# Patient Record
Sex: MALE | Race: WHITE | Employment: FULL TIME | URBAN - METROPOLITAN AREA
[De-identification: names, ages, dates, MRNs, and addresses within clinical notes are randomized per-mention and may not be internally consistent; named-entity substitution may affect disease eponyms.]

---

## 2017-02-20 RX ORDER — LOSARTAN POTASSIUM 100 MG/1
100 TABLET ORAL DAILY
Qty: 30 TAB | Refills: 3 | Status: SHIPPED | OUTPATIENT
Start: 2017-02-20 | End: 2017-04-26 | Stop reason: SDUPTHER

## 2017-04-26 ENCOUNTER — OFFICE VISIT (OUTPATIENT)
Dept: INTERNAL MEDICINE CLINIC | Age: 62
End: 2017-04-26

## 2017-04-26 ENCOUNTER — HOSPITAL ENCOUNTER (OUTPATIENT)
Dept: LAB | Age: 62
Discharge: HOME | End: 2017-04-26

## 2017-04-26 VITALS
BODY MASS INDEX: 26.21 KG/M2 | OXYGEN SATURATION: 97 % | WEIGHT: 204.2 LBS | HEART RATE: 56 BPM | HEIGHT: 74 IN | TEMPERATURE: 97.4 F | RESPIRATION RATE: 16 BRPM | DIASTOLIC BLOOD PRESSURE: 82 MMHG | SYSTOLIC BLOOD PRESSURE: 136 MMHG

## 2017-04-26 DIAGNOSIS — Z00.00 PHYSICAL EXAM: Primary | ICD-10-CM

## 2017-04-26 DIAGNOSIS — I10 ESSENTIAL HYPERTENSION: ICD-10-CM

## 2017-04-26 PROCEDURE — 99001 SPECIMEN HANDLING PT-LAB: CPT | Performed by: INTERNAL MEDICINE

## 2017-04-26 RX ORDER — HYDROCHLOROTHIAZIDE 25 MG/1
25 TABLET ORAL DAILY
Qty: 90 TAB | Refills: 3 | Status: SHIPPED | OUTPATIENT
Start: 2017-04-26 | End: 2018-05-23 | Stop reason: SDUPTHER

## 2017-04-26 RX ORDER — LOSARTAN POTASSIUM 100 MG/1
100 TABLET ORAL DAILY
Qty: 90 TAB | Refills: 3 | Status: SHIPPED | OUTPATIENT
Start: 2017-04-26 | End: 2018-03-29 | Stop reason: SDUPTHER

## 2017-04-26 NOTE — MR AVS SNAPSHOT
Visit Information Date & Time Provider Department Dept. Phone Encounter #  
 4/26/2017  7:30 AM [address] [phone] [id number] Follow-up Instructions Return in about 1 year (around 4/26/2018), or if symptoms worsen or fail to improve, for hypertension. Upcoming Health Maintenance Date Due FOBT Q 1 YEAR AGE 50-75 2/12/2005 ZOSTER VACCINE AGE 60> 2/12/2015 INFLUENZA AGE 9 TO ADULT 8/1/2016 DTaP/Tdap/Td series (2 - Td) 4/26/2027 Allergies as of 4/26/2017  Review Complete On: 4/26/2017 By: Jessica Pat MD  
 No Known Allergies Current Immunizations  Reviewed on 3/8/2013 Name Date Hep A Vaccine (Adult) 2/5/2013 Hep B Vaccine (Adult) 3/8/2013, 2/5/2013 Influenza Vaccine Split 11/7/2012 Typhoid Vaccine, Parenteral, Other than Acetone-Killed, Dried 2/5/2013 Not reviewed this visit You Were Diagnosed With   
  
 Codes Comments Physical exam    -  Primary ICD-10-CM: Z00.00 ICD-9-CM: V70.9 Essential hypertension     ICD-10-CM: I10 
ICD-9-CM: 401.9 Vitals BP Pulse Temp Resp Height(growth percentile) Weight(growth percentile) 136/82 (BP 1 Location: Left arm, BP Patient Position: Sitting) (!) 56 97.4 °F (36.3 °C) (Oral) 16 6' 2\" (1.88 m) 204 lb 3.2 oz (92.6 kg) SpO2 BMI Smoking Status 97% 26.22 kg/m2 Never Smoker Vitals History BMI and BSA Data Body Mass Index Body Surface Area  
 26.22 kg/m 2 2.2 m 2 Preferred Pharmacy Pharmacy Name Phone [address] [facility] [phone] Your Updated Medication List  
  
   
This list is accurate as of: 4/26/17  8:05 AM.  Always use your most recent med list.  
  
  
  
  
 hydroCHLOROthiazide 25 mg tablet Commonly known as:  HYDRODIURIL Take 1 Tab by mouth daily. losartan 100 mg tablet Commonly known as:  COZAAR  
 Take 1 Tab by mouth daily. Follow-up Instructions Return in about 1 year (around 4/26/2018), or if symptoms worsen or fail to improve, for hypertension. To-Do List   
 04/26/2017 Lab:  LIPID PANEL   
  
 04/26/2017 Lab:  METABOLIC PANEL, COMPREHENSIVE   
  
 04/26/2017 Lab:  PSA SCREENING (SCREENING) Patient Instructions DASH Diet: Care Instructions Your Care Instructions The DASH diet is an eating plan that can help lower your blood pressure. DASH stands for Dietary Approaches to Stop Hypertension. Hypertension is high blood pressure. The DASH diet focuses on eating foods that are high in calcium, potassium, and magnesium. These nutrients can lower blood pressure. The foods that are highest in these nutrients are fruits, vegetables, low-fat dairy products, nuts, seeds, and legumes. But taking calcium, potassium, and magnesium supplements instead of eating foods that are high in those nutrients does not have the same effect. The DASH diet also includes whole grains, fish, and poultry. The DASH diet is one of several lifestyle changes your doctor may recommend to lower your high blood pressure. Your doctor may also want you to decrease the amount of sodium in your diet. Lowering sodium while following the DASH diet can lower blood pressure even further than just the DASH diet alone. Follow-up care is a key part of your treatment and safety. Be sure to make and go to all appointments, and call your doctor if you are having problems. It's also a good idea to know your test results and keep a list of the medicines you take. How can you care for yourself at home? Following the DASH diet · Eat 4 to 5 servings of fruit each day. A serving is 1 medium-sized piece of fruit, ½ cup chopped or canned fruit, 1/4 cup dried fruit, or 4 ounces (½ cup) of fruit juice. Choose fruit more often than fruit juice. · Eat 4 to 5 servings of vegetables each day. A serving is 1 cup of lettuce or raw leafy vegetables, ½ cup of chopped or cooked vegetables, or 4 ounces (½ cup) of vegetable juice. Choose vegetables more often than vegetable juice. · Get 2 to 3 servings of low-fat and fat-free dairy each day. A serving is 8 ounces of milk, 1 cup of yogurt, or 1 ½ ounces of cheese. · Eat 6 to 8 servings of grains each day. A serving is 1 slice of bread, 1 ounce of dry cereal, or ½ cup of cooked rice, pasta, or cooked cereal. Try to choose whole-grain products as much as possible. · Limit lean meat, poultry, and fish to 2 servings each day. A serving is 3 ounces, about the size of a deck of cards. · Eat 4 to 5 servings of nuts, seeds, and legumes (cooked dried beans, lentils, and split peas) each week. A serving is 1/3 cup of nuts, 2 tablespoons of seeds, or ½ cup of cooked beans or peas. · Limit fats and oils to 2 to 3 servings each day. A serving is 1 teaspoon of vegetable oil or 2 tablespoons of salad dressing. · Limit sweets and added sugars to 5 servings or less a week. A serving is 1 tablespoon jelly or jam, ½ cup sorbet, or 1 cup of lemonade. · Eat less than 2,300 milligrams (mg) of sodium a day. If you limit your sodium to 1,500 mg a day, you can lower your blood pressure even more. Tips for success · Start small. Do not try to make dramatic changes to your diet all at once. You might feel that you are missing out on your favorite foods and then be more likely to not follow the plan. Make small changes, and stick with them. Once those changes become habit, add a few more changes. · Try some of the following: ¨ Make it a goal to eat a fruit or vegetable at every meal and at snacks. This will make it easy to get the recommended amount of fruits and vegetables each day. ¨ Try yogurt topped with fruit and nuts for a snack or healthy dessert. ¨ Add lettuce, tomato, cucumber, and onion to sandwiches. ¨ Combine a ready-made pizza crust with low-fat mozzarella cheese and lots of vegetable toppings. Try using tomatoes, squash, spinach, broccoli, carrots, cauliflower, and onions. ¨ Have a variety of cut-up vegetables with a low-fat dip as an appetizer instead of chips and dip. ¨ Sprinkle sunflower seeds or chopped almonds over salads. Or try adding chopped walnuts or almonds to cooked vegetables. ¨ Try some vegetarian meals using beans and peas. Add garbanzo or kidney beans to salads. Make burritos and tacos with mashed meeks beans or black beans. Where can you learn more? Go to http://chayito-shimon.info/. Enter J314 in the search box to learn more about \"DASH Diet: Care Instructions. \" Current as of: March 23, 2016 Content Version: 11.2 © 1336-0218 [employer]uxe[employer]. Care instructions adapted under license by bitFlyer (which disclaims liability or warranty for this information). If you have questions about a medical condition or this instruction, always ask your healthcare professional. [place] [address] any warranty or liability for your use of this information. Introducing [facility] & HEALTH SERVICES! Dear Ronnell Cat: Thank you for requesting a Microarrays account. Our records indicate that you already have an active Microarrays account. You can access your account anytime at [email or web address] Did you know that you can access your hospital and ER discharge instructions at any time in Microarrays? You can also review all of your test results from your hospital stay or ER visit. Additional Information If you have questions, please visit the Frequently Asked Questions section of the Microarrays website at [email or web address] Remember, Microarrays is NOT to be used for urgent needs. For medical emergencies, dial 911. Now available from your iPhone and Android! Please provide this summary of care documentation to your next provider. Your primary care clinician is listed as Wilfred Sylvester. If you have any questions after today's visit, please call [phone].

## 2017-04-26 NOTE — PATIENT INSTRUCTIONS

## 2017-04-26 NOTE — PROGRESS NOTES
Pt presents today for a physical exam     Pt preferred language for health care discussion is english. Is someone accompanying this pt? no    Is the patient using any DME equipment during OV? no    Depression Screening completed. yes    Learning Assessment completed. yes    Abuse Screening completed. yes    Health Maintenance reviewed and discussed per provider. yes    Advance Directive:  1. Do you have an advance directive in place? Patient Reply: No    2. If not, would you like material regarding how to put one in place? Patient Reply:     Coordination of Care:  1. Have you been to the ER, urgent care clinic since your last visit? Hospitalized since your last visit? no    2.  Have you seen or consulted any other health care providers outside of the [address] [place] [address] since your last visit?  no
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
## 2017-04-27 LAB
ALBUMIN SERPL-MCNC: 4.4 G/DL (ref 3.6–4.8)
ALBUMIN/GLOB SERPL: 1.7 {RATIO} (ref 1.2–2.2)
ALP SERPL-CCNC: 45 IU/L (ref 39–117)
ALT SERPL-CCNC: 24 IU/L (ref 0–44)
AST SERPL-CCNC: 27 IU/L (ref 0–40)
BILIRUB SERPL-MCNC: 0.5 MG/DL (ref 0–1.2)
BUN SERPL-MCNC: 15 MG/DL (ref 8–27)
BUN/CREAT SERPL: 17 (ref 10–24)
CALCIUM SERPL-MCNC: 9.7 MG/DL (ref 8.6–10.2)
CHLORIDE SERPL-SCNC: 97 MMOL/L (ref 96–106)
CHOLEST SERPL-MCNC: 213 MG/DL (ref 100–199)
CO2 SERPL-SCNC: 20 MMOL/L (ref 18–29)
CREAT SERPL-MCNC: 0.88 MG/DL (ref 0.76–1.27)
GLOBULIN SER CALC-MCNC: 2.6 G/DL (ref 1.5–4.5)
GLUCOSE SERPL-MCNC: 95 MG/DL (ref 65–99)
HDLC SERPL-MCNC: 62 MG/DL
INTERPRETATION, 910389: NORMAL
LDLC SERPL CALC-MCNC: 133 MG/DL (ref 0–99)
POTASSIUM SERPL-SCNC: 4.4 MMOL/L (ref 3.5–5.2)
PROT SERPL-MCNC: 7 G/DL (ref 6–8.5)
PSA SERPL-MCNC: 1.7 NG/ML (ref 0–4)
SODIUM SERPL-SCNC: 138 MMOL/L (ref 134–144)
TRIGL SERPL-MCNC: 89 MG/DL (ref 0–149)
VLDLC SERPL CALC-MCNC: 18 MG/DL (ref 5–40)

## 2017-09-08 ENCOUNTER — OFFICE VISIT (OUTPATIENT)
Dept: INTERNAL MEDICINE CLINIC | Age: 62
End: 2017-09-08

## 2017-09-08 ENCOUNTER — HOSPITAL ENCOUNTER (OUTPATIENT)
Dept: LAB | Age: 62
Discharge: HOME | End: 2017-09-08

## 2017-09-08 VITALS
DIASTOLIC BLOOD PRESSURE: 78 MMHG | WEIGHT: 196 LBS | RESPIRATION RATE: 16 BRPM | SYSTOLIC BLOOD PRESSURE: 111 MMHG | HEART RATE: 68 BPM | BODY MASS INDEX: 25.15 KG/M2 | HEIGHT: 74 IN | TEMPERATURE: 98.3 F | OXYGEN SATURATION: 96 %

## 2017-09-08 DIAGNOSIS — I10 ESSENTIAL HYPERTENSION: ICD-10-CM

## 2017-09-08 DIAGNOSIS — S22.41XD CLOSED FRACTURE OF MULTIPLE RIBS OF RIGHT SIDE WITH ROUTINE HEALING, SUBSEQUENT ENCOUNTER: Primary | ICD-10-CM

## 2017-09-08 PROCEDURE — 99001 SPECIMEN HANDLING PT-LAB: CPT | Performed by: INTERNAL MEDICINE

## 2017-09-08 RX ORDER — OXYCODONE AND ACETAMINOPHEN 5; 325 MG/1; MG/1
TABLET ORAL
COMMUNITY
Start: 2017-09-06 | End: 2019-08-22

## 2017-09-08 NOTE — MR AVS SNAPSHOT
Visit Information Date & Time Provider Department Dept. Phone Encounter #  
 9/8/2017  2:45 PM Tim Sommers [address] [phone] Follow-up Instructions Return if symptoms worsen or fail to improve. Upcoming Health Maintenance Date Due FOBT Q 1 YEAR AGE 50-75 2/12/2005 ZOSTER VACCINE AGE 60> 12/12/2014 DTaP/Tdap/Td series (2 - Td) 4/26/2027 Allergies as of 9/8/2017  Review Complete On: 4/26/2017 By: Tim Sommers MD  
 No Known Allergies Current Immunizations  Reviewed on 3/8/2013 Name Date Hep A Vaccine (Adult) 2/5/2013 Hep B Vaccine (Adult) 3/8/2013, 2/5/2013 Influenza Vaccine Split 11/7/2012 Typhoid Vaccine, Parenteral, Other than Acetone-Killed, Dried 2/5/2013 Not reviewed this visit You Were Diagnosed With   
  
 Codes Comments Closed fracture of multiple ribs of right side with routine healing, subsequent encounter    -  Primary ICD-10-CM: S22.41XD ICD-9-CM: V54.19 Essential hypertension     ICD-10-CM: I10 
ICD-9-CM: 401.9 Vitals BP Pulse Temp Resp Height(growth percentile) Weight(growth percentile) 111/78 (BP 1 Location: Left arm, BP Patient Position: Sitting) 68 98.3 °F (36.8 °C) (Oral) 16 6' 2\" (1.88 m) 196 lb (88.9 kg) SpO2 BMI Smoking Status 96% 25.16 kg/m2 Never Smoker Vitals History BMI and BSA Data Body Mass Index Body Surface Area  
 25.16 kg/m 2 2.15 m 2 Preferred Pharmacy Pharmacy Name Phone [address] [facility] [phone] Your Updated Medication List  
  
   
This list is accurate as of: 9/8/17  3:00 PM.  Always use your most recent med list.  
  
  
  
  
 hydroCHLOROthiazide 25 mg tablet Commonly known as:  HYDRODIURIL Take 1 Tab by mouth daily. losartan 100 mg tablet Commonly known as:  COZAAR Take 1 Tab by mouth daily. oxyCODONE-acetaminophen 5-325 mg per tablet Commonly known as:  PERCOCET Take 1 Tab by Mouth Every 6 Hours As Needed. spironolactone 25 mg tab 50 mg, hydroCHLOROthiazide 25 mg tab 50 mg Take  by Mouth. We Performed the Following CBC WITH AUTOMATED DIFF [77167 CPT(R)] METABOLIC PANEL, COMPREHENSIVE [48361 CPT(R)] URINALYSIS W/ RFLX MICROSCOPIC [10804 CPT(R)] Follow-up Instructions Return if symptoms worsen or fail to improve. To-Do List   
 09/08/2017 Lab:  CBC WITH AUTOMATED DIFF   
  
 09/08/2017 Lab:  METABOLIC PANEL, COMPREHENSIVE   
  
 09/08/2017 Lab:  URINALYSIS W/ RFLX MICROSCOPIC   
  
 09/08/2017 Imaging:  XR CHEST PA LAT Patient Instructions Broken Rib: Care Instructions Your Care Instructions A broken rib is a crack or break in one of the bones of the rib cage. Breathing can be very painful because the muscles used for breathing pull on the rib. In most cases, a broken rib will heal on its own. You can take pain medicine while the rib mends. Pain relief allows you to take deep breaths. In the past, doctors recommended taping or wrapping broken ribs. This is no longer done because taping makes it hard for you to take deep breaths. You need to take deep breaths at least once an hour to prevent pneumonia or a partial collapse of a lung. Your rib will heal in about 6 weeks. You heal best when you take good care of yourself. Eat a variety of healthy foods, and don't smoke. Follow-up care is a key part of your treatment and safety. Be sure to make and go to all appointments, and call your doctor if you are having problems. It's also a good idea to know your test results and keep a list of the medicines you take. How can you care for yourself at home? · Be safe with medicines. Read and follow all instructions on the label. ¨ If the doctor gave you a prescription medicine for pain, take it as prescribed. ¨ If you are not taking a prescription pain medicine, ask your doctor if you can take an over-the-counter medicine. · Even if it hurts, cough or take the deepest breath you can at least once every hour. This will get air deeply into your lungs and reduce your chance of getting pneumonia or a partial collapse of a lung. Hold a pillow against your chest to make this less painful. · Put ice or a cold pack on the area for 10 to 20 minutes at a time. Put a thin cloth between the ice and your skin. When should you call for help? Call 911 anytime you think you may need emergency care. For example, call if: 
· You have severe trouble breathing. Call your doctor now or seek immediate medical care if: 
· You have some trouble breathing. · You have a fever. · You have a new or worse cough. Watch closely for changes in your health, and be sure to contact your doctor if: 
· You have pain even after taking your medicine. · You do not get better as expected. Where can you learn more? Go to http://chayito-shimon.info/. Enter M135 in the search box to learn more about \"Broken Rib: Care Instructions. \" Current as of: March 21, 2017 Content Version: 11.3 © 1330-6069 [employer]. Care instructions adapted under license by Jianshu (which disclaims liability or warranty for this information). If you have questions about a medical condition or this instruction, always ask your healthcare professional. [place] [address] any warranty or liability for your use of this information. Introducing [facility] & HEALTH SERVICES! Dear Ed Mccollum: Thank you for requesting a Educents account. Our records indicate that you already have an active Educents account. You can access your account anytime at [email or web address] Did you know that you can access your hospital and ER discharge instructions at any time in Educents?   You can also review all of your test results from your hospital stay or ER visit. Additional Information If you have questions, please visit the Frequently Asked Questions section of the Yipit website at [email or web address] Remember, Yipit is NOT to be used for urgent needs. For medical emergencies, dial 911. Now available from your iPhone and Android! Please provide this summary of care documentation to your next provider. Your primary care clinician is listed as Gustavo Anders. If you have any questions after today's visit, please call [phone].
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
## 2017-09-08 NOTE — PATIENT INSTRUCTIONS
Broken Rib: Care Instructions  Your Care Instructions    A broken rib is a crack or break in one of the bones of the rib cage. Breathing can be very painful because the muscles used for breathing pull on the rib. In most cases, a broken rib will heal on its own. You can take pain medicine while the rib mends. Pain relief allows you to take deep breaths. In the past, doctors recommended taping or wrapping broken ribs. This is no longer done because taping makes it hard for you to take deep breaths. You need to take deep breaths at least once an hour to prevent pneumonia or a partial collapse of a lung. Your rib will heal in about 6 weeks. You heal best when you take good care of yourself. Eat a variety of healthy foods, and don't smoke. Follow-up care is a key part of your treatment and safety. Be sure to make and go to all appointments, and call your doctor if you are having problems. It's also a good idea to know your test results and keep a list of the medicines you take. How can you care for yourself at home? · Be safe with medicines. Read and follow all instructions on the label. ¨ If the doctor gave you a prescription medicine for pain, take it as prescribed. ¨ If you are not taking a prescription pain medicine, ask your doctor if you can take an over-the-counter medicine. · Even if it hurts, cough or take the deepest breath you can at least once every hour. This will get air deeply into your lungs and reduce your chance of getting pneumonia or a partial collapse of a lung. Hold a pillow against your chest to make this less painful. · Put ice or a cold pack on the area for 10 to 20 minutes at a time. Put a thin cloth between the ice and your skin. When should you call for help? Call 911 anytime you think you may need emergency care. For example, call if:  · You have severe trouble breathing. Call your doctor now or seek immediate medical care if:  · You have some trouble breathing.   · You have a fever. · You have a new or worse cough. Watch closely for changes in your health, and be sure to contact your doctor if:  · You have pain even after taking your medicine. · You do not get better as expected. Where can you learn more? Go to http://chayito-shimon.info/. Enter M135 in the search box to learn more about \"Broken Rib: Care Instructions. \"  Current as of: March 21, 2017  Content Version: 11.3  © 9870-9788 [employer]. Care instructions adapted under license by [employer] (which disclaims liability or warranty for this information). If you have questions about a medical condition or this instruction, always ask your healthcare professional. [address] any warranty or liability for your use of this information.
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
## 2017-09-09 LAB
ALBUMIN SERPL-MCNC: 4.7 G/DL (ref 3.6–4.8)
ALBUMIN/GLOB SERPL: 1.8 {RATIO} (ref 1.2–2.2)
ALP SERPL-CCNC: 48 IU/L (ref 39–117)
ALT SERPL-CCNC: 18 IU/L (ref 0–44)
APPEARANCE UR: CLEAR
AST SERPL-CCNC: 21 IU/L (ref 0–40)
BASOPHILS # BLD AUTO: 0 X10E3/UL (ref 0–0.2)
BASOPHILS NFR BLD AUTO: 0 %
BILIRUB SERPL-MCNC: 1.1 MG/DL (ref 0–1.2)
BILIRUB UR QL STRIP: NEGATIVE
BUN SERPL-MCNC: 14 MG/DL (ref 8–27)
BUN/CREAT SERPL: 16 (ref 10–24)
CALCIUM SERPL-MCNC: 10.1 MG/DL (ref 8.6–10.2)
CHLORIDE SERPL-SCNC: 90 MMOL/L (ref 96–106)
CO2 SERPL-SCNC: 26 MMOL/L (ref 18–29)
COLOR UR: YELLOW
CREAT SERPL-MCNC: 0.89 MG/DL (ref 0.76–1.27)
EOSINOPHIL # BLD AUTO: 0.1 X10E3/UL (ref 0–0.4)
EOSINOPHIL NFR BLD AUTO: 1 %
ERYTHROCYTE [DISTWIDTH] IN BLOOD BY AUTOMATED COUNT: 13.1 % (ref 12.3–15.4)
GLOBULIN SER CALC-MCNC: 2.6 G/DL (ref 1.5–4.5)
GLUCOSE SERPL-MCNC: 95 MG/DL (ref 65–99)
GLUCOSE UR QL: NEGATIVE
HCT VFR BLD AUTO: 42.1 % (ref 37.5–51)
HGB BLD-MCNC: 14.1 G/DL (ref 12.6–17.7)
HGB UR QL STRIP: NEGATIVE
IMM GRANULOCYTES # BLD: 0 X10E3/UL (ref 0–0.1)
IMM GRANULOCYTES NFR BLD: 0 %
KETONES UR QL STRIP: NEGATIVE
LEUKOCYTE ESTERASE UR QL STRIP: NEGATIVE
LYMPHOCYTES # BLD AUTO: 1.9 X10E3/UL (ref 0.7–3.1)
LYMPHOCYTES NFR BLD AUTO: 26 %
MCH RBC QN AUTO: 30.5 PG (ref 26.6–33)
MCHC RBC AUTO-ENTMCNC: 33.5 G/DL (ref 31.5–35.7)
MCV RBC AUTO: 91 FL (ref 79–97)
MICRO URNS: NORMAL
MONOCYTES # BLD AUTO: 0.9 X10E3/UL (ref 0.1–0.9)
MONOCYTES NFR BLD AUTO: 12 %
NEUTROPHILS # BLD AUTO: 4.3 X10E3/UL (ref 1.4–7)
NEUTROPHILS NFR BLD AUTO: 61 %
NITRITE UR QL STRIP: NEGATIVE
PH UR STRIP: 6.5 [PH] (ref 5–7.5)
PLATELET # BLD AUTO: 216 X10E3/UL (ref 150–379)
POTASSIUM SERPL-SCNC: 4.2 MMOL/L (ref 3.5–5.2)
PROT SERPL-MCNC: 7.3 G/DL (ref 6–8.5)
PROT UR QL STRIP: NEGATIVE
RBC # BLD AUTO: 4.63 X10E6/UL (ref 4.14–5.8)
SODIUM SERPL-SCNC: 135 MMOL/L (ref 134–144)
SP GR UR: 1.01 (ref 1–1.03)
UROBILINOGEN UR STRIP-MCNC: 0.2 MG/DL (ref 0.2–1)
WBC # BLD AUTO: 7.1 X10E3/UL (ref 3.4–10.8)

## 2017-09-11 ENCOUNTER — TELEPHONE (OUTPATIENT)
Dept: INTERNAL MEDICINE CLINIC | Age: 62
End: 2017-09-11

## 2017-09-11 DIAGNOSIS — S27.0XXD TRAUMATIC PNEUMOTHORAX, SUBSEQUENT ENCOUNTER: Primary | ICD-10-CM

## 2018-01-22 ENCOUNTER — OFFICE VISIT (OUTPATIENT)
Dept: ORTHOPEDIC SURGERY | Facility: CLINIC | Age: 63
End: 2018-01-22

## 2018-01-22 VITALS
HEART RATE: 66 BPM | RESPIRATION RATE: 14 BRPM | DIASTOLIC BLOOD PRESSURE: 79 MMHG | HEIGHT: 74 IN | SYSTOLIC BLOOD PRESSURE: 123 MMHG | OXYGEN SATURATION: 97 % | BODY MASS INDEX: 26.44 KG/M2 | WEIGHT: 206 LBS

## 2018-01-22 DIAGNOSIS — M17.11 PRIMARY OSTEOARTHRITIS OF RIGHT KNEE: Primary | ICD-10-CM

## 2018-01-22 DIAGNOSIS — M25.561 ACUTE PAIN OF RIGHT KNEE: ICD-10-CM

## 2018-01-22 NOTE — MR AVS SNAPSHOT
Teresa Causey 
 
 
 [address], [address] [place] [id number] 
[phone] Patient: Chuy Root MRN: [id number] PW Visit Information Date & Time Provider Department Dept. Phone Encounter #  
 2018  2:30 PM Kiel Escobar MD [facility] [phone] Follow-up Instructions Return in about 2 weeks (around 2018). Upcoming Health Maintenance Date Due FOBT Q 1 YEAR AGE 50-75 2005 ZOSTER VACCINE AGE 60> 2014 DTaP/Tdap/Td series (2 - Td) 2027 Allergies as of 2018  Review Complete On: 2018 By: Ad Xiao No Known Allergies Current Immunizations  Reviewed on 3/8/2013 Name Date Hep A Vaccine (Adult) 2013 Hep B Vaccine (Adult) 3/8/2013, 2013 Influenza Vaccine Split 2012 Typhoid Vaccine, Parenteral, Other than Acetone-Killed, Dried 2013 Not reviewed this visit You Were Diagnosed With   
  
 Codes Comments Primary osteoarthritis of right knee    -  Primary ICD-10-CM: M17.11 ICD-9-CM: 715.16 Acute pain of right knee     ICD-10-CM: M25.561 ICD-9-CM: 719.46 Vitals BP Pulse Resp Height(growth percentile) Weight(growth percentile) SpO2  
 123/79 (BP 1 Location: Left arm, BP Patient Position: Sitting) 66 14 6' 2\" (1.88 m) 206 lb (93.4 kg) 97% BMI Smoking Status 26.45 kg/m2 Never Smoker Vitals History BMI and BSA Data Body Mass Index Body Surface Area  
 26.45 kg/m 2 2.21 m 2 Preferred Pharmacy Pharmacy Name Phone [address] [facility] [phone] Your Updated Medication List  
  
   
This list is accurate as of: 18  3:39 PM.  Always use your most recent med list.  
  
  
  
  
 hydroCHLOROthiazide 25 mg tablet Commonly known as:  HYDRODIURIL Take 1 Tab by mouth daily. losartan 100 mg tablet Commonly known as:  COZAAR Take 1 Tab by mouth daily. oxyCODONE-acetaminophen 5-325 mg per tablet Commonly known as:  PERCOCET Take 1 Tab by Mouth Every 6 Hours As Needed. spironolactone 25 mg tab 50 mg, hydroCHLOROthiazide 25 mg tab 50 mg Take  by Mouth. We Performed the Following AMB POC X-RAY KNEE 3 VIEW [12135 CPT(R)] Follow-up Instructions Return in about 2 weeks (around 2/5/2018). Patient Instructions MRI of the Knee: About This Test 
What is it? MRI (magnetic resonance imaging) is a test that uses a magnetic field and pulses of radio wave energy to make pictures of the organs and structures inside the body. An MRI can give your doctor information about your knee, the bones around it, and the tissues around it, such as cartilage, ligaments, and tendons. When you have an MRI, you lie on a table and the table moves into the MRI machine. Why is this test done? An MRI of the knee can help find problems such as damage to the ligaments and cartilage around the knee. The MRI also can look for the cause of unexplained knee pain or the knee giving out for no reason. How can you prepare for the test? 
Talk to your doctor about all your health conditions before the test. For example, tell your doctor if: 
· You are allergic to any medicines. · You are or might be pregnant. · You have a pacemaker, an artificial limb, any metal pins or metal parts in your body, metal heart valves, metal clips in your brain, metal implants in your ears, or any other implanted or prosthetic medical device. · You have an intrauterine device (IUD) in place. · You get nervous in confined spaces. You may need medicine to help you relax. · You wear a patch that contains medicine. · You have kidney disease. What happens before the test? 
· You will remove all metal objects, such as hearing aids, dentures, jewelry, watches, and hairpins. · You will take off all or most of your clothes and change into a gown. If you do leave some clothes on, make sure you take everything out of your pockets. · You may have contrast material (dye) put into your arm through a tube called an IV. Contrast material helps doctors see specific organs, blood vessels, and most tumors. What happens during the test? 
· You will lie on your back on a table that is part of the MRI scanner. Your head, chest, and arms may be held with straps to help you remain still. · The table will slide into the space that contains the magnet. A device called a coil may be placed over or wrapped around your knee. · Inside the scanner you will hear a fan and feel air moving. You may hear tapping, thumping, or snapping noises. You may be given earplugs or headphones to reduce the noise. · You will be asked to hold still during the scan. You may be asked to hold your breath for short periods. · You may be alone in the scanning room, but a technologist will be watching you through a window and talking with you during the test. 
What else should you know about the test? 
· An MRI does not hurt. · If a dye is used, you may feel a quick sting or pinch and some coolness when the IV is started. The dye may give you a metallic taste in your mouth. Some people feel sick to their stomach or get a headache. · If you breastfeed and are concerned about whether the dye used in this test is safe, talk to your doctor. Most experts believe that very little dye passes into breast milk and even less is passed on to the baby. But if you prefer, you can store some of your breast milk ahead of time and use it for a day or two after the test. 
· You may feel warmth in the area being examined. This is normal. 
How long does the test take? · The test usually takes 30 to 60 minutes but can take as long as 2 hours.  
What happens after the test? 
 · You will probably be able to go home right away, depending on the reason for the test. 
· You can go back to your usual activities right away. Follow-up care is a key part of your treatment and safety. Be sure to make and go to all appointments, and call your doctor if you are having problems. It's also a good idea to keep a list of the medicines you take. Ask your doctor when you can expect to have your test results. Where can you learn more? Go to http://chayito-shimon.info/. Enter  in the search box to learn more about \"MRI of the Knee: About This Test.\" Current as of: October 14, 2016 Content Version: 11.4 © 9982-0480 [employer]. Care instructions adapted under license by [employer] (which disclaims liability or warranty for this information). If you have questions about a medical condition or this instruction, always ask your healthcare professional. [place] [address] any warranty or liability for your use of this information. Introducing [facility] & HEALTH SERVICES! Dear Jorge L Jason: Thank you for requesting a Changers account. Our records indicate that you already have an active Changers account. You can access your account anytime at [email or web address] Did you know that you can access your hospital and ER discharge instructions at any time in Changers? You can also review all of your test results from your hospital stay or ER visit. Additional Information If you have questions, please visit the Frequently Asked Questions section of the Changers website at [email or web address] Remember, Changers is NOT to be used for urgent needs. For medical emergencies, dial 911. Now available from your iPhone and Android! Please provide this summary of care documentation to your next provider. Your primary care clinician is listed as Gustavo García  If you have any questions after today's visit, please call [phone].
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
## 2018-01-22 NOTE — PATIENT INSTRUCTIONS
MRI of the Knee: About This Test  What is it? MRI (magnetic resonance imaging) is a test that uses a magnetic field and pulses of radio wave energy to make pictures of the organs and structures inside the body. An MRI can give your doctor information about your knee, the bones around it, and the tissues around it, such as cartilage, ligaments, and tendons. When you have an MRI, you lie on a table and the table moves into the MRI machine. Why is this test done? An MRI of the knee can help find problems such as damage to the ligaments and cartilage around the knee. The MRI also can look for the cause of unexplained knee pain or the knee giving out for no reason. How can you prepare for the test?  Talk to your doctor about all your health conditions before the test. For example, tell your doctor if:  · You are allergic to any medicines. · You are or might be pregnant. · You have a pacemaker, an artificial limb, any metal pins or metal parts in your body, metal heart valves, metal clips in your brain, metal implants in your ears, or any other implanted or prosthetic medical device. · You have an intrauterine device (IUD) in place. · You get nervous in confined spaces. You may need medicine to help you relax. · You wear a patch that contains medicine. · You have kidney disease. What happens before the test?  · You will remove all metal objects, such as hearing aids, dentures, jewelry, watches, and hairpins. · You will take off all or most of your clothes and change into a gown. If you do leave some clothes on, make sure you take everything out of your pockets. · You may have contrast material (dye) put into your arm through a tube called an IV. Contrast material helps doctors see specific organs, blood vessels, and most tumors. What happens during the test?  · You will lie on your back on a table that is part of the MRI scanner.  Your head, chest, and arms may be held with straps to help you remain still.  · The table will slide into the space that contains the magnet. A device called a coil may be placed over or wrapped around your knee. · Inside the scanner you will hear a fan and feel air moving. You may hear tapping, thumping, or snapping noises. You may be given earplugs or headphones to reduce the noise. · You will be asked to hold still during the scan. You may be asked to hold your breath for short periods. · You may be alone in the scanning room, but a technologist will be watching you through a window and talking with you during the test.  What else should you know about the test?  · An MRI does not hurt. · If a dye is used, you may feel a quick sting or pinch and some coolness when the IV is started. The dye may give you a metallic taste in your mouth. Some people feel sick to their stomach or get a headache. · If you breastfeed and are concerned about whether the dye used in this test is safe, talk to your doctor. Most experts believe that very little dye passes into breast milk and even less is passed on to the baby. But if you prefer, you can store some of your breast milk ahead of time and use it for a day or two after the test.  · You may feel warmth in the area being examined. This is normal.  How long does the test take? · The test usually takes 30 to 60 minutes but can take as long as 2 hours. What happens after the test?  · You will probably be able to go home right away, depending on the reason for the test.  · You can go back to your usual activities right away. Follow-up care is a key part of your treatment and safety. Be sure to make and go to all appointments, and call your doctor if you are having problems. It's also a good idea to keep a list of the medicines you take. Ask your doctor when you can expect to have your test results. Where can you learn more? Go to http://chayito-shimon.info/.   Enter  in the search box to learn more about \"MRI of the Knee: About This Test.\"  Current as of: October 14, 2016  Content Version: 11.4  © 8301-4025 [employer]wise[employer]. Care instructions adapted under license by [employer] (which disclaims liability or warranty for this information). If you have questions about a medical condition or this instruction, always ask your healthcare professional. [place] [address] any warranty or liability for your use of this information.

## 2018-01-29 ENCOUNTER — HOSPITAL ENCOUNTER (OUTPATIENT)
Dept: MRI IMAGING | Age: 63
Discharge: HOME | End: 2018-01-29
Attending: SPECIALIST
Payer: COMMERCIAL

## 2018-01-29 DIAGNOSIS — M17.11 PRIMARY OSTEOARTHRITIS OF RIGHT KNEE: ICD-10-CM

## 2018-01-29 DIAGNOSIS — M25.561 ACUTE PAIN OF RIGHT KNEE: ICD-10-CM

## 2018-01-29 PROCEDURE — 73721 MRI JNT OF LWR EXTRE W/O DYE: CPT

## 2018-02-02 ENCOUNTER — OFFICE VISIT (OUTPATIENT)
Dept: ORTHOPEDIC SURGERY | Facility: CLINIC | Age: 63
End: 2018-02-02

## 2018-02-02 VITALS
WEIGHT: 209.2 LBS | DIASTOLIC BLOOD PRESSURE: 71 MMHG | BODY MASS INDEX: 26.85 KG/M2 | SYSTOLIC BLOOD PRESSURE: 117 MMHG | HEART RATE: 57 BPM | RESPIRATION RATE: 18 BRPM | HEIGHT: 74 IN | TEMPERATURE: 95.7 F | OXYGEN SATURATION: 99 %

## 2018-02-02 DIAGNOSIS — M23.321 DEGENERATIVE TEAR OF POSTERIOR HORN OF MEDIAL MENISCUS, RIGHT: ICD-10-CM

## 2018-02-02 DIAGNOSIS — M17.11 PRIMARY OSTEOARTHRITIS OF RIGHT KNEE: Primary | ICD-10-CM

## 2018-02-02 DIAGNOSIS — M25.561 ACUTE PAIN OF RIGHT KNEE: ICD-10-CM

## 2018-02-02 NOTE — PATIENT INSTRUCTIONS
Knee Arthritis: Exercises  Your Care Instructions  Here are some examples of exercises for knee arthritis. Start each exercise slowly. Ease off the exercise if you start to have pain. Your doctor or physical therapist will tell you when you can start these exercises and which ones will work best for you. How to do the exercises  Knee flexion with heel slide    1. Lie on your back with your knees bent. 2. Slide your heel back by bending your affected knee as far as you can. Then hook your other foot around your ankle to help pull your heel even farther back. 3. Hold for about 6 seconds, then rest for up to 10 seconds. 4. Repeat 8 to 12 times. 5. Switch legs and repeat steps 1 through 4, even if only one knee is sore. Quad sets    1. Sit with your affected leg straight and supported on the floor or a firm bed. Place a small, rolled-up towel under your knee. Your other leg should be bent, with that foot flat on the floor. 2. Tighten the thigh muscles of your affected leg by pressing the back of your knee down into the towel. 3. Hold for about 6 seconds, then rest for up to 10 seconds. 4. Repeat 8 to 12 times. 5. Switch legs and repeat steps 1 through 4, even if only one knee is sore. Straight-leg raises to the front    1. Lie on your back with your good knee bent so that your foot rests flat on the floor. Your affected leg should be straight. Make sure that your low back has a normal curve. You should be able to slip your hand in between the floor and the small of your back, with your palm touching the floor and your back touching the back of your hand. 2. Tighten the thigh muscles in your affected leg by pressing the back of your knee flat down to the floor. Hold your knee straight. 3. Keeping the thigh muscles tight and your leg straight, lift your affected leg up so that your heel is about 12 inches off the floor. Hold for about 6 seconds, then lower slowly.   4. Relax for up to 10 seconds between repetitions. 5. Repeat 8 to 12 times. 6. Switch legs and repeat steps 1 through 5, even if only one knee is sore. Active knee flexion    1. Lie on your stomach with your knees straight. If your kneecap is uncomfortable, roll up a washcloth and put it under your leg just above your kneecap. 2. Lift the foot of your affected leg by bending the knee so that you bring the foot up toward your buttock. If this motion hurts, try it without bending your knee quite as far. This may help you avoid any painful motion. 3. Slowly move your leg up and down. 4. Repeat 8 to 12 times. 5. Switch legs and repeat steps 1 through 4, even if only one knee is sore. Quadriceps stretch (facedown)    1. Lie flat on your stomach, and rest your face on the floor. 2. Wrap a towel or belt strap around the lower part of your affected leg. Then use the towel or belt strap to slowly pull your heel toward your buttock until you feel a stretch. 3. Hold for about 15 to 30 seconds, then relax your leg against the towel or belt strap. 4. Repeat 2 to 4 times. 5. Switch legs and repeat steps 1 through 4, even if only one knee is sore. Stationary exercise bike    1. If you do not have a stationary exercise bike at home, you can find one to ride at your local health club or community center. 2. Adjust the height of the bike seat so that your knee is slightly bent when your leg is extended downward. If your knee hurts when the pedal reaches the top, you can raise the seat so that your knee does not bend as much. 3. Start slowly. At first, try to do 5 to 10 minutes of cycling with little to no resistance. Then increase your time and the resistance bit by bit until you can do 20 to 30 minutes without pain. 4. If you start to have pain, rest your knee until your pain gets back to the level that is normal for you. Or cycle for less time or with less effort. Follow-up care is a key part of your treatment and safety.  Be sure to make and go to all appointments, and call your doctor if you are having problems. It's also a good idea to know your test results and keep a list of the medicines you take. Where can you learn more? Go to http://chayito-shimon.info/. Enter C159 in the search box to learn more about \"Knee Arthritis: Exercises. \"  Current as of: March 21, 2017  Content Version: 11.4  © 9678-8652 [employer]. Care instructions adapted under license by [employer] (which disclaims liability or warranty for this information). If you have questions about a medical condition or this instruction, always ask your healthcare professional. [address] any warranty or liability for your use of this information.
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
## 2018-03-29 RX ORDER — LOSARTAN POTASSIUM 100 MG/1
TABLET ORAL
Qty: 90 TAB | Refills: 3 | Status: SHIPPED | OUTPATIENT
Start: 2018-03-29 | End: 2018-04-17 | Stop reason: SDUPTHER

## 2018-04-17 RX ORDER — LOSARTAN POTASSIUM 100 MG/1
TABLET ORAL
Qty: 90 TAB | Refills: 3 | Status: SHIPPED | OUTPATIENT
Start: 2018-04-17 | End: 2019-04-29 | Stop reason: SDUPTHER

## 2018-04-17 NOTE — TELEPHONE ENCOUNTER
Requested Prescriptions     Pending Prescriptions Disp Refills    losartan (COZAAR) 100 mg tablet 90 Tab 3

## 2018-05-23 RX ORDER — HYDROCHLOROTHIAZIDE 25 MG/1
25 TABLET ORAL DAILY
Qty: 30 TAB | Refills: 5 | Status: SHIPPED | OUTPATIENT
Start: 2018-05-23 | End: 2018-11-24 | Stop reason: SDUPTHER

## 2018-05-23 RX ORDER — LOSARTAN POTASSIUM 100 MG/1
TABLET ORAL
Qty: 30 TAB | Refills: 5 | Status: SHIPPED | OUTPATIENT
Start: 2018-05-23 | End: 2019-08-22 | Stop reason: SDUPTHER

## 2018-05-23 NOTE — TELEPHONE ENCOUNTER
Patient request, states Ins no longer wants him to use the [facility] pharmacy, but to send a 30 day Rx only to [facility]. Requested Prescriptions     Pending Prescriptions Disp Refills    losartan (COZAAR) 100 mg tablet 30 Tab 5     Sig: TAKE 1 TABLET DAILY    hydroCHLOROthiazide (HYDRODIURIL) 25 mg tablet 30 Tab 5     Sig: Take 1 Tab by mouth daily.

## 2018-09-17 ENCOUNTER — TELEPHONE (OUTPATIENT)
Dept: INTERNAL MEDICINE CLINIC | Age: 63
End: 2018-09-17

## 2018-09-17 NOTE — TELEPHONE ENCOUNTER
Patient is requesting a prescription for Sonzeba Benes. States he will be taking a trip to [facility] Nov through Dec.  States he trusts your judgement if her needs this or not.

## 2018-09-18 NOTE — TELEPHONE ENCOUNTER
Please ask that he contact a travel clinic to determine if this is needed for the areas he is traveling to.

## 2018-09-19 NOTE — TELEPHONE ENCOUNTER
Attempted to contact pt at mobile number, no answer. Lvm for pt to return call to office at [phone]. Pt contacted at home number. Spoke with pt wife. 2 pt identifiers confirmed. Pt wife states that they researched the CDC web site and they do recommend since they will staying in [place] one month. Please advise.

## 2018-09-21 RX ORDER — MEFLOQUINE HYDROCHLORIDE 250 MG/1
TABLET ORAL
Qty: 12 TAB | Refills: 0 | Status: SHIPPED | OUTPATIENT
Start: 2018-09-21 | End: 2019-08-22

## 2019-04-01 RX ORDER — HYDROCHLOROTHIAZIDE 25 MG/1
TABLET ORAL
Qty: 90 TAB | Refills: 0 | Status: SHIPPED | OUTPATIENT
Start: 2019-04-01 | End: 2019-07-08 | Stop reason: SDUPTHER

## 2019-04-29 RX ORDER — LOSARTAN POTASSIUM 100 MG/1
TABLET ORAL
Qty: 30 TAB | Refills: 11 | Status: SHIPPED | OUTPATIENT
Start: 2019-04-29 | End: 2019-08-22

## 2019-07-31 RX ORDER — HYDROCHLOROTHIAZIDE 25 MG/1
TABLET ORAL
Qty: 30 TAB | Refills: 0 | OUTPATIENT
Start: 2019-07-31

## 2019-07-31 NOTE — TELEPHONE ENCOUNTER
Patient contacted at home number. 2 patient identifiers confirmed. Patient informed of below. Patient verbalized understanding. Patient scheduled for Thursday, August 22, 2019 02:30 PM. No other questions at this time.

## 2019-08-22 ENCOUNTER — OFFICE VISIT (OUTPATIENT)
Dept: INTERNAL MEDICINE CLINIC | Age: 64
End: 2019-08-22

## 2019-08-22 VITALS
OXYGEN SATURATION: 96 % | SYSTOLIC BLOOD PRESSURE: 132 MMHG | TEMPERATURE: 97.3 F | HEIGHT: 74 IN | RESPIRATION RATE: 16 BRPM | HEART RATE: 61 BPM | BODY MASS INDEX: 26.05 KG/M2 | WEIGHT: 203 LBS | DIASTOLIC BLOOD PRESSURE: 88 MMHG

## 2019-08-22 DIAGNOSIS — Z13.29 THYROID DISORDER SCREENING: ICD-10-CM

## 2019-08-22 DIAGNOSIS — Z12.5 SCREENING PSA (PROSTATE SPECIFIC ANTIGEN): ICD-10-CM

## 2019-08-22 DIAGNOSIS — Z13.1 DIABETES MELLITUS SCREENING: ICD-10-CM

## 2019-08-22 DIAGNOSIS — Z13.21 ENCOUNTER FOR VITAMIN DEFICIENCY SCREENING: ICD-10-CM

## 2019-08-22 DIAGNOSIS — I10 BENIGN HYPERTENSION WITHOUT CHF: Primary | ICD-10-CM

## 2019-08-22 DIAGNOSIS — N40.1 BENIGN PROSTATIC HYPERPLASIA WITH LOWER URINARY TRACT SYMPTOMS, SYMPTOM DETAILS UNSPECIFIED: ICD-10-CM

## 2019-08-22 RX ORDER — LOSARTAN POTASSIUM 100 MG/1
TABLET ORAL
Qty: 30 TAB | Refills: 11 | Status: SHIPPED | OUTPATIENT
Start: 2019-08-22

## 2019-08-22 RX ORDER — HYDROCHLOROTHIAZIDE 25 MG/1
TABLET ORAL
Qty: 30 TAB | Refills: 11 | Status: SHIPPED | OUTPATIENT
Start: 2019-08-22

## 2019-08-22 RX ORDER — TAMSULOSIN HYDROCHLORIDE 0.4 MG/1
0.4 CAPSULE ORAL DAILY
Qty: 30 CAP | Refills: 3 | Status: SHIPPED | OUTPATIENT
Start: 2019-08-22

## 2019-08-22 NOTE — PROGRESS NOTES
Chief Complaint   Patient presents with    Hypertension         HPI:     Clemente Narvaez is a 59 y.o.  male with history of hypertension and rib fracture here for the above complaint. He has problems with urination and increase urgency, but problems with dribbling. This has been going on for 6 weeks. Nocturia. Moles on back. It has not changed in size or color. His wife was concerned and wanted them to be checked. He denies any chest pain, shortness of breath, abdominal pain, headaches or dizziness. Past Medical History:   Diagnosis Date    Hypertension     Pneumothorax     Rib fracture     5 th        Past Surgical History:   Procedure Laterality Date    HX ORTHOPAEDIC      MRI KNEE LT W CONT             MEDICATION ALLERGIES/INTOLERANCES:   No Known Allergies          CURRENT MEDICATIONS:    Current Outpatient Medications   Medication Sig    hydroCHLOROthiazide (HYDRODIURIL) 25 mg tablet TAKE 1 TABLET BY MOUTH ONCE DAILY    losartan (COZAAR) 100 mg tablet TAKE 1 TABLET DAILY    tamsulosin (FLOMAX) 0.4 mg capsule Take 1 Cap by mouth daily. No current facility-administered medications for this visit. Health Maintenance   Topic Date Due    Shingrix Vaccine Age 50> (1 of 2) 02/12/2005    FOBT Q 1 YEAR AGE 50-75  02/12/2005    Influenza Age 5 to Adult  08/01/2019    DTaP/Tdap/Td series (2 - Td) 04/26/2027    Hepatitis C Screening  Completed    Pneumococcal 0-64 years  Aged Out         FAMILY HISTORY:   History reviewed. No pertinent family history. SOCIAL HISTORY:   He  reports that he has never smoked. He has never used smokeless tobacco.  He  reports that he drinks about 1.7 standard drinks of alcohol per week.         OBJECTIVE:  PHYSICAL EXAM: Vitals:   Vitals:    08/22/19 1441 08/22/19 1454   BP: 162/81 132/80   Pulse: 61    Resp: 16    Temp: 97.3 °F (36.3 °C)    TempSrc: Oral    SpO2: 96%    Weight: 203 lb (92.1 kg)    Height: 6' 2\" (1.88 m)      Exam: Generally: Pleasant male in no acute distress    Cardiac exam: regular, rate, and rhythm. No murmurs, gallops, or rubs. Normal S1 and S2.    Pulmonary exam: Clear to ausculation bilaterally    Abdominal exam: Positive bowel sounds in all four quadrants. Soft. Nondistended. Nontender. No hepatosplenomegaly. Prostate exam: Nontender, positive for enlarged prostate    Extremities: 2+ dorsalis pedis bilaterally. No pedal edema bilaterally. SKin: various cherry angiomas on chest and abdomen. Also, seborrheic keratosis on chest, abdomen and back. He has a dark mole under right breast which looks regular and looks benign. Offered to refer to dermatology for further evaluation, but he refuses at this time. LABS/RADIOLOGICAL TESTS:   Lab Results   Component Value Date/Time    WBC 7.1 09/08/2017 03:00 PM    HGB 14.1 09/08/2017 03:00 PM    HCT 42.1 09/08/2017 03:00 PM    PLATELET 879 23/25/8864 03:00 PM     Lab Results   Component Value Date/Time    Sodium 135 09/08/2017 03:00 PM    Potassium 4.2 09/08/2017 03:00 PM    Chloride 90 (L) 09/08/2017 03:00 PM    CO2 26 09/08/2017 03:00 PM    Glucose 95 09/08/2017 03:00 PM    BUN 14 09/08/2017 03:00 PM    Creatinine 0.89 09/08/2017 03:00 PM     Lab Results   Component Value Date/Time    Cholesterol, total 213 (H) 04/26/2017 08:18 AM    HDL Cholesterol 62 04/26/2017 08:18 AM    LDL, calculated 133 (H) 04/26/2017 08:18 AM    Triglyceride 89 04/26/2017 08:18 AM     No results found for: GPT    Previous labs          ASSESSMENT/PLAN:      ICD-10-CM ICD-9-CM    1. Benign hypertension without CHF I10 401.1 Somewhat stable. Continue diet, exercise and HCTZ and cozaar. Pt was anxious about prostate exam. He wants to monitor his blood pressure and his wife is RN and update us on Monday with readings.    CBC W/O DIFF      METABOLIC PANEL, COMPREHENSIVE      LIPID PANEL      hydroCHLOROthiazide (HYDRODIURIL) 25 mg tablet      losartan (COZAAR) 100 mg tablet      URINALYSIS W/ RFLX MICROSCOPIC      URINALYSIS W/ RFLX MICROSCOPIC      LIPID PANEL      METABOLIC PANEL, COMPREHENSIVE      CBC W/O DIFF   2. Benign prostatic hyperplasia with lower urinary tract symptoms, symptom details unspecified N40.1 600.01 Will start flomax. If not better, may need to make med adjustments and referral to urology. tamsulosin (FLOMAX) 0.4 mg capsule   3. Screening PSA (prostate specific antigen) Z12.5 V76.44 PSA, DIAGNOSTIC (PROSTATE SPECIFIC AG)      PSA, DIAGNOSTIC (PROSTATE SPECIFIC AG)   4. Encounter for vitamin deficiency screening Z13.21 V77.99 VITAMIN D, 25 HYDROXY      VITAMIN D, 25 HYDROXY   5. Diabetes mellitus screening Z13.1 V77.1 HEMOGLOBIN A1C WITH EAG      HEMOGLOBIN A1C WITH EAG   6. Thyroid disorder screening Z13.29 V77.0 TSH 3RD GENERATION      TSH 3RD GENERATION     7. Requested Prescriptions     Signed Prescriptions Disp Refills    hydroCHLOROthiazide (HYDRODIURIL) 25 mg tablet 30 Tab 11     Sig: TAKE 1 TABLET BY MOUTH ONCE DAILY    losartan (COZAAR) 100 mg tablet 30 Tab 11     Sig: TAKE 1 TABLET DAILY    tamsulosin (FLOMAX) 0.4 mg capsule 30 Cap 3     Sig: Take 1 Cap by mouth daily. 8. Patient verbalized understanding and agreement with the plan. 9. Patient was given an after visit summary. 10.   Follow-up and Dispositions    · Return in about 1 year (around 8/22/2020) for f/u HTN  or sooner if worsening symptoms.                Hayden Yun MD

## 2019-08-23 LAB
25(OH)D3 SERPL-MCNC: 30.6 NG/ML (ref 32–100)
A-G RATIO,AGRAT: 1.8 RATIO (ref 1.1–2.6)
ALBUMIN SERPL-MCNC: 4.7 G/DL (ref 3.5–5)
ALP SERPL-CCNC: 55 U/L (ref 40–125)
ALT SERPL-CCNC: 32 U/L (ref 5–40)
ANION GAP SERPL CALC-SCNC: 16 MMOL/L
AST SERPL W P-5'-P-CCNC: 26 U/L (ref 10–37)
AVG GLU, 10930: 110 MG/DL (ref 91–123)
BILIRUB SERPL-MCNC: 0.9 MG/DL (ref 0.2–1.2)
BILIRUB UR QL: NEGATIVE
BUN SERPL-MCNC: 13 MG/DL (ref 6–22)
CALCIUM SERPL-MCNC: 10.2 MG/DL (ref 8.4–10.4)
CHLORIDE SERPL-SCNC: 102 MMOL/L (ref 98–110)
CHOLEST SERPL-MCNC: 189 MG/DL (ref 110–200)
CO2 SERPL-SCNC: 24 MMOL/L (ref 20–32)
CREAT SERPL-MCNC: 0.9 MG/DL (ref 0.8–1.6)
ERYTHROCYTE [DISTWIDTH] IN BLOOD BY AUTOMATED COUNT: 12.8 % (ref 10–15.5)
GFRAA, 66117: >60
GFRNA, 66118: >60
GLOBULIN,GLOB: 2.6 G/DL (ref 2–4)
GLUCOSE SERPL-MCNC: 91 MG/DL (ref 70–99)
GLUCOSE UR QL: NEGATIVE MG/DL
HBA1C MFR BLD HPLC: 5.5 % (ref 4.8–5.9)
HCT VFR BLD AUTO: 41.2 % (ref 39.3–51.6)
HDLC SERPL-MCNC: 3.3 MG/DL (ref 0–5)
HDLC SERPL-MCNC: 58 MG/DL (ref 40–59)
HGB BLD-MCNC: 13.7 G/DL (ref 13.1–17.2)
HGB UR QL STRIP: NEGATIVE
KETONES UR QL STRIP.AUTO: NEGATIVE MG/DL
LDLC SERPL CALC-MCNC: 111 MG/DL (ref 50–99)
LEUKOCYTE ESTERASE: NEGATIVE
MCH RBC QN AUTO: 31 PG (ref 26–34)
MCHC RBC AUTO-ENTMCNC: 33 G/DL (ref 31–36)
MCV RBC AUTO: 93 FL (ref 80–95)
NITRITE UR QL STRIP.AUTO: NEGATIVE
PH UR STRIP: 7 PH (ref 5–8)
PLATELET # BLD AUTO: 198 K/UL (ref 140–440)
PMV BLD AUTO: 11.2 FL (ref 9–13)
POTASSIUM SERPL-SCNC: 4.1 MMOL/L (ref 3.5–5.5)
PROT SERPL-MCNC: 7.3 G/DL (ref 6.2–8.1)
PROT UR QL STRIP: NEGATIVE MG/DL
PSA SERPL-MCNC: 2.25 NG/ML
RBC # BLD AUTO: 4.41 M/UL (ref 3.8–5.8)
RBC #/AREA URNS HPF: NEGATIVE /HPF
SODIUM SERPL-SCNC: 142 MMOL/L (ref 133–145)
SP GR UR: 1.01 (ref 1–1.03)
TRIGL SERPL-MCNC: 100 MG/DL (ref 40–149)
TSH SERPL DL<=0.005 MIU/L-ACNC: 2.56 MCU/ML (ref 0.27–4.2)
UROBILINOGEN UR STRIP-MCNC: <2 MG/DL
VLDLC SERPL CALC-MCNC: 20 MG/DL (ref 8–30)
WBC # BLD AUTO: 6.3 K/UL (ref 4–11)
WBC URNS QL MICRO: NORMAL /HPF (ref 0–2)

## 2019-08-30 PROBLEM — E55.9 VITAMIN D DEFICIENCY: Status: ACTIVE | Noted: 2019-08-01

## 2021-10-11 ENCOUNTER — OFFICE VISIT (OUTPATIENT)
Dept: SURGERY | Age: 66
End: 2021-10-11
Payer: MEDICARE

## 2021-10-11 VITALS
SYSTOLIC BLOOD PRESSURE: 132 MMHG | OXYGEN SATURATION: 98 % | HEIGHT: 74 IN | DIASTOLIC BLOOD PRESSURE: 81 MMHG | BODY MASS INDEX: 26.15 KG/M2 | HEART RATE: 70 BPM | WEIGHT: 203.8 LBS

## 2021-10-11 DIAGNOSIS — L72.3 SEBACEOUS CYST: Primary | ICD-10-CM

## 2021-10-11 PROCEDURE — G8419 CALC BMI OUT NRM PARAM NOF/U: HCPCS | Performed by: SURGERY

## 2021-10-11 PROCEDURE — 3017F COLORECTAL CA SCREEN DOC REV: CPT | Performed by: SURGERY

## 2021-10-11 PROCEDURE — G8427 DOCREV CUR MEDS BY ELIG CLIN: HCPCS | Performed by: SURGERY

## 2021-10-11 PROCEDURE — 99203 OFFICE O/P NEW LOW 30 MIN: CPT | Performed by: SURGERY

## 2021-10-11 PROCEDURE — G8536 NO DOC ELDER MAL SCRN: HCPCS | Performed by: SURGERY

## 2021-10-11 PROCEDURE — G8432 DEP SCR NOT DOC, RNG: HCPCS | Performed by: SURGERY

## 2021-10-11 PROCEDURE — G8752 SYS BP LESS 140: HCPCS | Performed by: SURGERY

## 2021-10-11 PROCEDURE — G8754 DIAS BP LESS 90: HCPCS | Performed by: SURGERY

## 2021-10-11 PROCEDURE — 1101F PT FALLS ASSESS-DOCD LE1/YR: CPT | Performed by: SURGERY

## 2021-10-11 NOTE — PROGRESS NOTES
Torie Gamboa is a 77 y.o. male (: 1955) presenting to address:    Chief Complaint   Patient presents with    New Patient     [place] cyst on neck x 5 years/ referrred by Sri Saleem       Medication list and allergies have been reviewed with Torie Gamboa and updated as of today's date. I have gone over all Medical, Surgical and Social History with Torie Gamboa and updated/added the information accordingly.

## 2021-10-13 NOTE — PROGRESS NOTES
General Surgery Consult    Florian Og  Admit date: (Not on file)    MRN: [id number]     : 1955     Age: 77 y.o. Attending Physician: Reece Peña MD, [facility]      History of Present Illness:      Florian Og is a 77 y.o. male who was referred to me for evaluation of a sebaceous cyst of the right shoulder. The patient stated that he had this for years but he would like it to be removed. Is not causing him any pain or discomfort. Patient Active Problem List    Diagnosis Date Noted    Vitamin D deficiency 2019    Hypercholesterolemia 2012    Hypertension 2012     Past Medical History:   Diagnosis Date    Hypertension     Pneumothorax     Rib fracture     5 th     Vitamin D deficiency 2019      Past Surgical History:   Procedure Laterality Date    HX ORTHOPAEDIC      MRI KNEE LT W CONT        Social History     Tobacco Use    Smoking status: Never Smoker    Smokeless tobacco: Never Used   Substance Use Topics    Alcohol use: Yes     Alcohol/week: 1.7 standard drinks     Types: 2 Cans of beer per week      Social History     Tobacco Use   Smoking Status Never Smoker   Smokeless Tobacco Never Used     History reviewed. No pertinent family history. Current Outpatient Medications   Medication Sig    hydroCHLOROthiazide (HYDRODIURIL) 25 mg tablet TAKE 1 TABLET BY MOUTH ONCE DAILY    losartan (COZAAR) 100 mg tablet TAKE 1 TABLET DAILY    tamsulosin (FLOMAX) 0.4 mg capsule Take 1 Cap by mouth daily. (Patient not taking: Reported on 10/11/2021)     No current facility-administered medications for this visit. No Known Allergies       Review of Systems:  Pertinent items are noted in the History of Present Illness.     Objective:     Visit Vitals  /81 (BP 1 Location: Right arm, BP Patient Position: Sitting)   Pulse 70   Ht 6' 2\" (1.88 m)   Wt 92.4 kg (203 lb 12.8 oz)   SpO2 98%   BMI 26.17 kg/m²       Physical Exam:      General:  in no apparent distress and alert                       Extremities: There is a small cyst about 1 cm in size located on the posterior right shoulder. It is consistent with a sebaceous cyst.            Imaging and Lab Review:     CBC:   Lab Results   Component Value Date/Time    WBC 6.3 08/22/2019 02:44 PM    RBC 4.41 08/22/2019 02:44 PM    HGB 13.7 08/22/2019 02:44 PM    HCT 41.2 08/22/2019 02:44 PM    PLATELET 373 93/24/2358 02:44 PM     BMP:   Lab Results   Component Value Date/Time    Glucose 91 08/22/2019 02:44 PM    Sodium 142 08/22/2019 02:44 PM    Potassium 4.1 08/22/2019 02:44 PM    Chloride 102 08/22/2019 02:44 PM    CO2 24 08/22/2019 02:44 PM    BUN 13 08/22/2019 02:44 PM    Creatinine 0.9 08/22/2019 02:44 PM    Calcium 10.2 08/22/2019 02:44 PM     CMP:  Lab Results   Component Value Date/Time    Glucose 91 08/22/2019 02:44 PM    Sodium 142 08/22/2019 02:44 PM    Potassium 4.1 08/22/2019 02:44 PM    Chloride 102 08/22/2019 02:44 PM    CO2 24 08/22/2019 02:44 PM    BUN 13 08/22/2019 02:44 PM    Creatinine 0.9 08/22/2019 02:44 PM    Calcium 10.2 08/22/2019 02:44 PM    Anion gap 16.0 08/22/2019 02:44 PM    BUN/Creatinine ratio 16 09/08/2017 03:00 PM    Alk. phosphatase 55 08/22/2019 02:44 PM    Protein, total 7.3 08/22/2019 02:44 PM    Albumin 4.7 08/22/2019 02:44 PM    Globulin 2.6 08/22/2019 02:44 PM    A-G Ratio 1.8 08/22/2019 02:44 PM       No results found for this or any previous visit (from the past 24 hour(s)). images and reports reviewed    Assessment:   Jennifer Almeida is a 77 y.o. male who is presenting with a sebaceous cyst of the right shoulder that has been present for years. The patient would like it to be removed which we will do in the office this Friday under local anesthesia. Plan:      We will schedule the patient for an office procedure on Friday for excision of her right shoulder sebaceous cyst    Please call me if you have any questions (cell phone: [phone])     Signed By: Noreen Gonzalez MD October 13, 2021

## 2021-10-15 ENCOUNTER — OFFICE VISIT (OUTPATIENT)
Dept: SURGERY | Age: 66
End: 2021-10-15
Payer: MEDICARE

## 2021-10-15 VITALS
WEIGHT: 202.6 LBS | OXYGEN SATURATION: 99 % | SYSTOLIC BLOOD PRESSURE: 132 MMHG | DIASTOLIC BLOOD PRESSURE: 83 MMHG | HEART RATE: 57 BPM | HEIGHT: 74 IN | TEMPERATURE: 97.3 F | BODY MASS INDEX: 26 KG/M2

## 2021-10-15 DIAGNOSIS — L72.3 SEBACEOUS CYST: Primary | ICD-10-CM

## 2021-10-15 PROCEDURE — G8754 DIAS BP LESS 90: HCPCS | Performed by: SURGERY

## 2021-10-15 PROCEDURE — G8419 CALC BMI OUT NRM PARAM NOF/U: HCPCS | Performed by: SURGERY

## 2021-10-15 PROCEDURE — 3017F COLORECTAL CA SCREEN DOC REV: CPT | Performed by: SURGERY

## 2021-10-15 PROCEDURE — G8536 NO DOC ELDER MAL SCRN: HCPCS | Performed by: SURGERY

## 2021-10-15 PROCEDURE — 99214 OFFICE O/P EST MOD 30 MIN: CPT | Performed by: SURGERY

## 2021-10-15 PROCEDURE — 11402 EXC TR-EXT B9+MARG 1.1-2 CM: CPT | Performed by: SURGERY

## 2021-10-15 PROCEDURE — G8427 DOCREV CUR MEDS BY ELIG CLIN: HCPCS | Performed by: SURGERY

## 2021-10-15 PROCEDURE — G8432 DEP SCR NOT DOC, RNG: HCPCS | Performed by: SURGERY

## 2021-10-15 PROCEDURE — G8752 SYS BP LESS 140: HCPCS | Performed by: SURGERY

## 2021-10-15 PROCEDURE — 1101F PT FALLS ASSESS-DOCD LE1/YR: CPT | Performed by: SURGERY

## 2021-10-15 NOTE — PROGRESS NOTES
Lachelle Devlin is a 77 y.o. male (: 1955) presenting to address:    Chief Complaint   Patient presents with    Procedure     excision of sebaceous cyst posterior neck       Medication list and allergies have been reviewed with Lachelle Devlin and updated as of today's date. I have gone over all Medical, Surgical and Social History with Lachelle Devlin and updated/added the information accordingly. 1. Have you been to the ER, Urgent Care or Hospitalized since your last visit? NO      2. Have you followed up with your PCP or any other Physicians since your procedure/ last office visit?    NO

## 2021-10-15 NOTE — PROGRESS NOTES
General Surgery Consult    Sam August  Admit date: (Not on file)    MRN: [id number]     : 1955     Age: 77 y.o. Attending Physician: Christiano Paredes MD, Located within [facility]      History of Present Illness:      Sam August is a 77 y.o. male who is here today for elective excision of a sebaceous cyst of the right shoulder. I saw the patient earlier this week and there is nothing has changed. Patient Active Problem List    Diagnosis Date Noted    Vitamin D deficiency 2019    Hypercholesterolemia 2012    Hypertension 2012     Past Medical History:   Diagnosis Date    Hypertension     Pneumothorax     Rib fracture     5 th     Vitamin D deficiency 2019      Past Surgical History:   Procedure Laterality Date    HX ORTHOPAEDIC      MRI KNEE LT W CONT        Social History     Tobacco Use    Smoking status: Never Smoker    Smokeless tobacco: Never Used   Substance Use Topics    Alcohol use: Yes     Alcohol/week: 1.7 standard drinks     Types: 2 Cans of beer per week      Social History     Tobacco Use   Smoking Status Never Smoker   Smokeless Tobacco Never Used     No family history on file. Current Outpatient Medications   Medication Sig    hydroCHLOROthiazide (HYDRODIURIL) 25 mg tablet TAKE 1 TABLET BY MOUTH ONCE DAILY    losartan (COZAAR) 100 mg tablet TAKE 1 TABLET DAILY    tamsulosin (FLOMAX) 0.4 mg capsule Take 1 Cap by mouth daily. (Patient not taking: Reported on 10/11/2021)     No current facility-administered medications for this visit. No Known Allergies       Review of Systems:  Pertinent items are noted in the History of Present Illness.     Objective:     Visit Vitals  /87 (BP 1 Location: Right lower arm, BP Patient Position: Sitting)   Pulse (!) 57   Temp 97.3 °F (36.3 °C) (Temporal)   Ht 6' 2\" (1.88 m)   Wt 91.9 kg (202 lb 9.6 oz)   SpO2 99%   BMI 26.01 kg/m²       Physical Exam:      General:  in no apparent distress Imaging and Lab Review:     CBC:   Lab Results   Component Value Date/Time    WBC 6.3 08/22/2019 02:44 PM    RBC 4.41 08/22/2019 02:44 PM    HGB 13.7 08/22/2019 02:44 PM    HCT 41.2 08/22/2019 02:44 PM    PLATELET 373 26/90/2660 02:44 PM     BMP:   Lab Results   Component Value Date/Time    Glucose 91 08/22/2019 02:44 PM    Sodium 142 08/22/2019 02:44 PM    Potassium 4.1 08/22/2019 02:44 PM    Chloride 102 08/22/2019 02:44 PM    CO2 24 08/22/2019 02:44 PM    BUN 13 08/22/2019 02:44 PM    Creatinine 0.9 08/22/2019 02:44 PM    Calcium 10.2 08/22/2019 02:44 PM     CMP:  Lab Results   Component Value Date/Time    Glucose 91 08/22/2019 02:44 PM    Sodium 142 08/22/2019 02:44 PM    Potassium 4.1 08/22/2019 02:44 PM    Chloride 102 08/22/2019 02:44 PM    CO2 24 08/22/2019 02:44 PM    BUN 13 08/22/2019 02:44 PM    Creatinine 0.9 08/22/2019 02:44 PM    Calcium 10.2 08/22/2019 02:44 PM    Anion gap 16.0 08/22/2019 02:44 PM    BUN/Creatinine ratio 16 09/08/2017 03:00 PM    Alk. phosphatase 55 08/22/2019 02:44 PM    Protein, total 7.3 08/22/2019 02:44 PM    Albumin 4.7 08/22/2019 02:44 PM    Globulin 2.6 08/22/2019 02:44 PM    A-G Ratio 1.8 08/22/2019 02:44 PM       No results found for this or any previous visit (from the past 24 hour(s)). images and reports reviewed    Assessment:   Genie Reynolds is a 77 y.o. male who is presenting with a sebaceous cyst of the right shoulder that is around 1 cm that he would like it to be removed. A consent was taken from the patient and we will do it under local anesthesia in the office today. The patient understands the risks including bleeding and infection as well as recurrence of the cyst.  He stated that he has no allergies. Plan:     A consent was taken from the patient. 1% lidocaine was used to infiltrate the area around the cyst.  An elliptical skin excision about 2 cm in length was performed to include the skin and subcutaneous tissue.   The cyst was dissected completely and removed. Hemostasis was secured with pressure. After doing make sure that there is no more bleeding I closed the skin with interrupted 2-0 Vicryl and then glue. I discarded the specimen since it is a sebaceous cyst and no need for pathology.     Please call me if you have any questions (cell phone: [phone])     Signed By: Roe Moralez MD     October 15, 2021

## 2022-03-19 PROBLEM — E55.9 VITAMIN D DEFICIENCY: Status: ACTIVE | Noted: 2019-08-01

## 2024-01-23 ENCOUNTER — HOSPITAL ENCOUNTER (OUTPATIENT)
Facility: HOSPITAL | Age: 69
Setting detail: RECURRING SERIES
Discharge: HOME | End: 2024-01-26
Payer: MEDICARE

## 2024-01-23 PROCEDURE — 97161 PT EVAL LOW COMPLEX 20 MIN: CPT
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
## 2024-01-26 ENCOUNTER — HOSPITAL ENCOUNTER (OUTPATIENT)
Facility: HOSPITAL | Age: 69
Setting detail: RECURRING SERIES
Discharge: HOME | End: 2024-01-29
Payer: MEDICARE

## 2024-01-26 PROCEDURE — 97112 NEUROMUSCULAR REEDUCATION: CPT

## 2024-01-26 NOTE — PROGRESS NOTES
PHYSICAL / OCCUPATIONAL THERAPY - DAILY TREATMENT NOTE    Patient Name: Hernandez Goldberg    Date: 2024    : 1955  Insurance: Payor: [employer] MEDICARE / Plan: [employer] GOLD PLUS HMO / Product Type: *No Product type* /      Patient  verified Yes     Visit #   Current / Total 2 24   Time   In / Out 9:25 9:50   Pain   In / Out 0 0   Subjective Functional Status/Changes: I did my ex's this morning already     TREATMENT AREA =  Pain in right foot [M79.671]  Achilles tendinitis, right leg [M76.61]  Plantar fascial fibromatosis [M72.2]    OBJECTIVE      Therapeutic Procedures:    Tx Min Billable or 1:1 Min (if diff from Tx Min) Procedure, Rationale, Specifics   25 25 26320 Neuromuscular Re-Education (timed):  improve balance, coordination, kinesthetic sense, posture, core stability and proprioception to improve patient's ability to develop conscious control of individual muscles and awareness of position of extremities in order to progress to PLOF and address remaining functional goals. (see flow sheet as applicable)     Details if applicable:              Details if applicable:            Details if applicable:            Details if applicable:            Details if applicable:     25 25 [facility] Totals Reminder: bill using total billable min of TIMED therapeutic procedures (example: do not include dry needle or estim unattended, both untimed codes, in totals to left)  8-22 min = 1 unit; 23-37 min = 2 units; 38-52 min = 3 units; 53-67 min = 4 units; 68-82 min = 5 units   Total Total     [x]  Patient Education billed concurrently with other procedures   [x] Review HEP    [] Progressed/Changed HEP, detail:    [] Other detail:       Objective Information/Functional Measures/Assessment    Patient seen for first follow-up after evaluation to address Pain in right foot [M79.671], Achilles tendinitis, right leg [M76.61], Plantar fascial fibromatosis [M72.2].  Patient arrived late due to confusion with schedule. Ex's

## 2024-01-31 ENCOUNTER — HOSPITAL ENCOUNTER (OUTPATIENT)
Facility: HOSPITAL | Age: 69
Setting detail: RECURRING SERIES
Discharge: HOME | End: 2024-02-03
Payer: MEDICARE

## 2024-01-31 PROCEDURE — 97530 THERAPEUTIC ACTIVITIES: CPT

## 2024-01-31 PROCEDURE — 97112 NEUROMUSCULAR REEDUCATION: CPT

## 2024-01-31 NOTE — PROGRESS NOTES
PHYSICAL / OCCUPATIONAL THERAPY - DAILY TREATMENT NOTE    Patient Name: Hernandez Goldberg    Date: 2024    : 1955  Insurance: Payor: [employer] MEDICARE / Plan: [employer] GOLD PLUS HMO / Product Type: *No Product type* /      Patient  verified Yes     Visit #   Current / Total 3 24   Time   In / Out 4:00 5:00   Pain   In / Out 1 1   Subjective Functional Status/Changes:      TREATMENT AREA =  Pain in right foot [M79.671]  Achilles tendinitis, right leg [M76.61]  Plantar fascial fibromatosis [M72.2]    OBJECTIVE      Therapeutic Procedures:    Tx Min Billable or 1:1 Min (if diff from Tx Min) Procedure, Rationale, Specifics0   12 12 42952 Therapeutic Activity (timed):  use of dynamic activities replicating functional movements to increase ROM, strength, coordination, balance, and proprioception in order to improve patient's ability to progress to PLOF and address remaining functional goals.  (see flow sheet as applicable)     Details if applicable:       48 02 90362 Neuromuscular Re-Education (timed):  improve balance, coordination, kinesthetic sense, posture, core stability and proprioception to improve patient's ability to develop conscious control of individual muscles and awareness of position of extremities in order to progress to PLOF and address remaining functional goals. (see flow sheet as applicable)     Details if applicable:            Details if applicable:            Details if applicable:            Details if applicable:     60 60 [facility] Totals Reminder: bill using total billable min of TIMED therapeutic procedures (example: do not include dry needle or estim unattended, both untimed codes, in totals to left)  8-22 min = 1 unit; 23-37 min = 2 units; 38-52 min = 3 units; 53-67 min = 4 units; 68-82 min = 5 units   Total Total     [x]  Patient Education billed concurrently with other procedures   [x] Review HEP    [] Progressed/Changed HEP, detail:    [] Other detail:       Objective

## 2024-02-02 ENCOUNTER — APPOINTMENT (OUTPATIENT)
Facility: HOSPITAL | Age: 69
End: 2024-02-02
Payer: MEDICARE

## 2024-02-07 ENCOUNTER — APPOINTMENT (OUTPATIENT)
Facility: HOSPITAL | Age: 69
End: 2024-02-07
Payer: MEDICARE

## 2024-02-19 ENCOUNTER — HOSPITAL ENCOUNTER (OUTPATIENT)
Facility: HOSPITAL | Age: 69
Setting detail: RECURRING SERIES
Discharge: HOME | End: 2024-02-22
Payer: MEDICARE

## 2024-02-19 PROCEDURE — 97110 THERAPEUTIC EXERCISES: CPT

## 2024-02-19 PROCEDURE — 97530 THERAPEUTIC ACTIVITIES: CPT

## 2024-02-19 NOTE — PROGRESS NOTES
PHYSICAL THERAPY - DISCHARGE DAILY NOTE AND SUMMARY (updated )    Patient Name: Hernandez Goldberg : 1955   Treatment/Medical Diagnosis: Pain in right foot [M79.671]  Achilles tendinitis, right leg [M76.61]  Plantar fascial fibromatosis [M72.2]   Referral Source: Manny Epps MD     Payor: Payor: [employer] MEDICARE / Plan: [employer] GOLD PLUS HMO / Product Type: *No Product type* /            Reporting Period : 2024 to 2024    Date: 2024    Patient  verified yes     Visit #   Current / Total 4 24   Time   In / Out 10:21 10:56   Pain   In / Out 0.5 0   Subjective Functional Status/Changes: Pt reports he ran for 30 mins with no pain and feels he is able to perform hi HEP on his own to manage his symptoms.      TREATMENT AREA =  Pain in right foot [M79.671]  Achilles tendinitis, right leg [M76.61]  Plantar fascial fibromatosis [M72.2]    OBJECTIVE  Therapeutic Procedures:  Tx Min Billable or 1:1 Min (if diff from Tx Min) Procedure, Rationale, Specifics   13 13 85220 Therapeutic Activity (timed):  use of dynamic activities replicating functional movements to increase ROM, strength, coordination, balance, and proprioception in order to improve patient's ability to progress to PLOF and address remaining functional goals.  (see flow sheet as applicable)     Details if applicable:  goals, FOTO, pt education on using pain as a guide with running/jogging/cycling and avoid pushing through increased pain.    10 10 08950 Therapeutic Exercise (timed):  increase ROM, strength, coordination, balance, and proprioception to improve patient's ability to progress to PLOF and address remaining functional goals. (see flow sheet as applicable)     Details if applicable:  HEP instruction and demonstration   12 0 86929 Neuromuscular Re-Education (timed):  improve balance, coordination, kinesthetic sense, posture, core stability and proprioception to improve patient's ability to develop conscious control of individual